# Patient Record
Sex: FEMALE | Race: WHITE | NOT HISPANIC OR LATINO | Employment: FULL TIME | URBAN - NONMETROPOLITAN AREA
[De-identification: names, ages, dates, MRNs, and addresses within clinical notes are randomized per-mention and may not be internally consistent; named-entity substitution may affect disease eponyms.]

---

## 2019-02-12 ENCOUNTER — OFFICE VISIT (OUTPATIENT)
Dept: FAMILY MEDICINE CLINIC | Facility: CLINIC | Age: 29
End: 2019-02-12

## 2019-02-12 VITALS
SYSTOLIC BLOOD PRESSURE: 128 MMHG | HEIGHT: 63 IN | HEART RATE: 76 BPM | WEIGHT: 216.6 LBS | DIASTOLIC BLOOD PRESSURE: 70 MMHG | BODY MASS INDEX: 38.38 KG/M2 | TEMPERATURE: 97.9 F

## 2019-02-12 DIAGNOSIS — S09.90XA TRAUMATIC INJURY OF HEAD, INITIAL ENCOUNTER: Primary | ICD-10-CM

## 2019-02-12 DIAGNOSIS — T14.8XXA HEMATOMA: ICD-10-CM

## 2019-02-12 DIAGNOSIS — S02.2XXA CLOSED FRACTURE OF NASAL BONE, INITIAL ENCOUNTER: ICD-10-CM

## 2019-02-12 DIAGNOSIS — R50.9 FEVER, UNSPECIFIED FEVER CAUSE: ICD-10-CM

## 2019-02-12 PROBLEM — E66.09 CLASS 2 OBESITY DUE TO EXCESS CALORIES WITHOUT SERIOUS COMORBIDITY WITH BODY MASS INDEX (BMI) OF 38.0 TO 38.9 IN ADULT: Chronic | Status: ACTIVE | Noted: 2019-02-12

## 2019-02-12 PROBLEM — J30.1 SEASONAL ALLERGIC RHINITIS DUE TO POLLEN: Chronic | Status: ACTIVE | Noted: 2019-02-12

## 2019-02-12 PROCEDURE — 99203 OFFICE O/P NEW LOW 30 MIN: CPT | Performed by: INTERNAL MEDICINE

## 2019-02-13 NOTE — PROGRESS NOTES
Subjective     Jacquelin Centeno is a 29 y.o. female.     History of Present Illness     I am meeting him for the first time today.  She is a new patient.  She is to the adult daughter of Melissa Epley.  Kevin Epley is her stepfather.  Her past history is reviewed and significant for obesity and seasonal allergies.  She is an  at the [employer].  She is not .  She has no children.  Her PMH, meds, allergies, SH, FH are all reviewed and documented today.    Lillian experienced a rather serious fall 2 weeks ago.  She was walking down stairs to her basement to do a load of laundry when she fell off of the side of the stairs.  She estimates that she fell approximately 4-5 feet, landing on a concrete floor.  The majority of her weight struck her forehead and face.  There was no loss of consciousness.  She denies concussion or postconcussion symptoms.  She experienced a large hematoma on the forehead with a laceration of the forehead.  She also experienced very slightly displaced fractures of her nasal bones.  She was evaluated and treated in the emergency room.  CT imaging of the head revealed no evidence of bleed or other acute intracranial injury.  CT scan of the facial bones revealed approximate 2 mm posterior and left displacement with the fracture.  There was no evidence of orbital fractures or fractures of other facial bones.  ENT evaluation was recommended in approximately 6 weeks after the swelling goes down.  A couple days after the initial fall, she was seen in [facility] urgent care due to some discolored nasal secretions and sinus/facial pressure.  She was given a 10 day course of Omnicef, as well as a Medrol Dosepak.  She is completing the Omnicef today.  She has experienced low-grade fever of approximately 99° nearly every evening, but is afebrile today.  She reports any nasal discharge is now clear.  She reports any postnasal drip is now clear.  She  "takes Claritin 10 mg daily chronically for allergies and postnasal drip.     She has experienced almost complete resolution of the forehead hematoma.  There is still some residual resolving bruising under both eyes.  She is describing some paresthesia when she rubs across her healing forehead scar and up into her anterior scalp.  Reassurance is given.      Review of Systems   Constitutional: Negative for chills, fatigue and fever.   HENT: Positive for congestion, postnasal drip and sinus pressure. Negative for ear pain and sore throat.    Respiratory: Negative for cough, shortness of breath and wheezing.    Cardiovascular: Negative for chest pain, palpitations and leg swelling.   Gastrointestinal: Negative for abdominal pain, blood in stool, constipation, diarrhea, nausea and vomiting.   Endocrine: Negative for cold intolerance, heat intolerance, polydipsia and polyuria.   Genitourinary: Negative for dysuria, frequency, hematuria and urgency.   Skin: Negative for rash.   Neurological: Negative for syncope and weakness.       Objective     /70   Pulse 76   Temp 97.9 °F (36.6 °C) (Oral)   Ht 160 cm (63\")   Wt 98.2 kg (216 lb 9.6 oz)   BMI 38.37 kg/m²     Physical Exam   Constitutional: She is oriented to person, place, and time. She appears well-developed and well-nourished. No distress.   Pleasant, intelligent, articulate, well spoken, obese female.  Accompanied by her mother, Laura.   HENT:   Head: Normocephalic and atraumatic.   Nose: Right sinus exhibits no maxillary sinus tenderness and no frontal sinus tenderness. Left sinus exhibits no maxillary sinus tenderness and no frontal sinus tenderness.   Mouth/Throat: Uvula is midline, oropharynx is clear and moist and mucous membranes are normal. No oral lesions. No tonsillar exudate.   No sinus tenderness.  Bilateral nasal mucosa is somewhat congested and mildly erythematous.  No purulent nasal discharge noted.  Resolving bruising noted under both eyes. "   Eyes: Conjunctivae and EOM are normal. Pupils are equal, round, and reactive to light.   Extraocular movements are intact.  Extremes of eye motions felt to reproduce any discomfort.  Normal vision.   No disconjugate gaze.   Neck: Trachea normal. Neck supple. No JVD present. Carotid bruit is not present. No tracheal deviation present. No thyroid mass and no thyromegaly present.   Cardiovascular: Normal rate, regular rhythm, normal heart sounds and intact distal pulses.  No extrasystoles are present. PMI is not displaced.   No murmur heard.  Pulmonary/Chest: Effort normal and breath sounds normal. No accessory muscle usage. No respiratory distress. She has no decreased breath sounds. She has no wheezes. She has no rhonchi. She has no rales.   Abdominal: Soft. Bowel sounds are normal. She exhibits no distension. There is no hepatosplenomegaly. There is no tenderness.   Obese abdomen.     Vascular Status -  Her right foot exhibits normal foot vasculature  and no edema. Her left foot exhibits normal foot vasculature  and no edema.  Lymphadenopathy:     She has no cervical adenopathy.   Neurological: She is alert and oriented to person, place, and time. No cranial nerve deficit. Coordination normal.   Skin: Skin is warm, dry and intact. No rash noted. No cyanosis. Nails show no clubbing.   The previous laceration of the forehead just proximal to the hairline is healing very nicely.  No sutures to remove.  The previous hematoma of the forehead is almost completely resolved.  Some resolving bruising is noted below both eyes.   Psychiatric: She has a normal mood and affect. Her speech is normal and behavior is normal. Judgment and thought content normal.   Vitals reviewed.      No flowsheet data found.    Assessment/Plan     She will complete the Omnicef antibiotic this evening or tomorrow morning.  I do not recommend any additional antibiotics at this time.  If she experiences persistent fever or recurrence of discolored  nasal discharge or onset of sinus pain or tenderness, she is to notify me.  I do recommend saline nasal spray 2 puffs per nostril 3-4 times daily until all symptoms are resolved.  She may continue the Claritin 10 mg daily if that seems to help decrease the rhinitis or postnasal drip.  She specifically denies chronic clear nasal discharge to suggest any CSF fluid.  Try using phenylephrine 10 mg 3 times a day when necessary to help with some residual congestion that she is experiencing.  I am getting x-rays of the sinuses today to rule out acute sinusitis, but my clinical suspicion is low.  Refer to Dr. Barkley to evaluate the nasal fractures and any residual symptoms.  Reassurance is given today.    We did not discuss her obese body habitus today, but would certainly want to address that with future visits.    Return to clinic as needed.  She was establishing as a new patient today.      Diagnoses and all orders for this visit:    Traumatic injury of head, initial encounter  -     XR Sinuses 3+ View  -     Ambulatory Referral to ENT (Otolaryngology)    Fever, unspecified fever cause  -     Ambulatory Referral to ENT (Otolaryngology)    Hematoma    Closed fracture of nasal bone, initial encounter  -     XR Sinuses 3+ View  -     Ambulatory Referral to ENT (Otolaryngology)        No visits with results within 3 Week(s) from this visit.   Latest known visit with results is:   No results found for any previous visit.   ]

## 2019-02-15 ENCOUNTER — OFFICE VISIT (OUTPATIENT)
Dept: OTOLARYNGOLOGY | Facility: CLINIC | Age: 29
End: 2019-02-15

## 2019-02-15 VITALS — BODY MASS INDEX: 38.8 KG/M2 | OXYGEN SATURATION: 98 % | WEIGHT: 219 LBS | HEIGHT: 63 IN

## 2019-02-15 DIAGNOSIS — S02.2XXA CLOSED FRACTURE OF NASAL BONE, INITIAL ENCOUNTER: Primary | ICD-10-CM

## 2019-02-15 DIAGNOSIS — J34.2 NASAL SEPTAL DEFORMITY: ICD-10-CM

## 2019-02-15 PROCEDURE — 99203 OFFICE O/P NEW LOW 30 MIN: CPT | Performed by: OTOLARYNGOLOGY

## 2019-02-15 PROCEDURE — 31231 NASAL ENDOSCOPY DX: CPT | Performed by: OTOLARYNGOLOGY

## 2019-02-18 NOTE — PROGRESS NOTES
Subjective   Jacquelin Centeno is a 29 y.o. female.     History of Present Illness   Patient reports that 18 days ago she fell at home striking her face and forehead.  Sustained lacerations of her forehead and a nasal fracture.  For reasons not entirely clear, patient states the emergency room doctor told her to wait 6 weeks before she followed up with otolaryngology regarding her nose.  She states that now that the swelling is gone down she is satisfied with the appearance of her nose.  She thinks the airflow may be slightly less than it used to be on the right side.  She was having headaches and low-grade fever and was given antibiotics for presumed sinusitis reports that her headaches are improving.  She is already had her sutures and staples removed from her forehead laceration.      The following portions of the patient's history were reviewed and updated as appropriate: allergies, current medications, past family history, past medical history, past social history, past surgical history and problem list.      Jacquelin Centeno reports that  has never smoked. she has never used smokeless tobacco. She reports that she does not drink alcohol or use drugs.  Patient is not a tobacco user and has not been counseled for use of tobacco products    Family History   Problem Relation Age of Onset   • Diabetes type II Mother         Diet-controlled   • Hypertension Father        No Known Allergies    No current outpatient medications on file.    Past Medical History:   Diagnosis Date   • Class 2 obesity due to excess calories without serious comorbidity with body mass index (BMI) of 38.0 to 38.9 in adult 2/12/2019   • Seasonal allergic rhinitis due to pollen 2/12/2019         Review of Systems   HENT: Positive for congestion, postnasal drip and sore throat.    Eyes: Positive for discharge.   Musculoskeletal: Positive for neck stiffness.   Neurological: Positive for headaches.   All other systems reviewed and are  negative.          Objective   Physical Exam  General: Well-developed well-nourished female in no acute distress.  Alert and oriented x-3. Head: Normocephalic. Face: Recently healed lacerations of the forehead.  Facial nerve function appears intact PERRL. EOMI. Voice:Strong. Speech:Fluent  Ears: External ears no deformity, canals no discharge, tympanic membranes intact with tympanosclerosis but no evidence of infection or effusion  Nose: Nares show septal deformity to the left that appears chronic.  No mucosal tears or hematoma or fresh blood.  The nasal dorsum shows a palpable irregularity on the right more so than the left but it appears stable without crepitus.  Oral cavity: Lips and gums without lesions.  Tongue and floor of mouth without lesions.  Parotid and submandibular ducts unobstructed.  No mucosal lesions on the buccal mucosa or vestibule of the mouth.  Pharynx: No erythema exudate mass or ulcer  Neck: No lymphadenopathy.  No thyromegaly.  Trachea and larynx midline.  No masses in the parotid or submandibular glands.  Nasal endoscopy is performed: Pedro Luis-Synephrine and Xylocaine are instilled the nares bilaterally.  0° scope is passed into each nostril.  The inferior, middle, and superior turbinates as well as nasal septum and nasopharynx are examined.  Pertinent findings include: Septal deformity to the left impacting on the lateral nasal wall at the level of the inferior turbinate that appears to be an old finding with no mucosal disruption.  Middle meatal cleft showed no evidence of mass, polyp, blood, purulence      Assessment/Plan   Jacquelin was seen today for fracture.    Diagnoses and all orders for this visit:    Closed fracture of nasal bone, initial encounter    Nasal septal deformity      Plan: Told the patient that given that she is 18 days out from injury and her nasal bones appear stable I do not think she would be a candidate for closed reduction, but since she is satisfied with the appearance  of her nose at this point this may not present too much of a problem going forward.  I did explain she has a nasal septal deformity and I advised her to wait until 6 months after surgery and decide if her inability to breathe well through her nose was a persistent and bothersome problem.  At that point she could return for a septoplasty or if she subsequently decides the appearance of her nose is unsatisfactory I would refer her for a septorhinoplasty.  Use nasal saline spray as needed.  Follow-up with me as needed.

## 2019-11-04 ENCOUNTER — OFFICE VISIT (OUTPATIENT)
Dept: OBSTETRICS AND GYNECOLOGY | Facility: CLINIC | Age: 29
End: 2019-11-04

## 2019-11-04 VITALS
DIASTOLIC BLOOD PRESSURE: 82 MMHG | BODY MASS INDEX: 35.37 KG/M2 | WEIGHT: 199.6 LBS | SYSTOLIC BLOOD PRESSURE: 120 MMHG | HEART RATE: 68 BPM | HEIGHT: 63 IN

## 2019-11-04 DIAGNOSIS — L98.9 SUPERFICIAL SKIN LESION: ICD-10-CM

## 2019-11-04 DIAGNOSIS — Z01.89 ROUTINE LAB DRAW: ICD-10-CM

## 2019-11-04 DIAGNOSIS — N94.6 DYSMENORRHEA: ICD-10-CM

## 2019-11-04 DIAGNOSIS — E66.9 OBESITY (BMI 35.0-39.9 WITHOUT COMORBIDITY): ICD-10-CM

## 2019-11-04 DIAGNOSIS — F32.81 PMDD (PREMENSTRUAL DYSPHORIC DISORDER): Primary | ICD-10-CM

## 2019-11-04 PROCEDURE — 99214 OFFICE O/P EST MOD 30 MIN: CPT | Performed by: NURSE PRACTITIONER

## 2019-11-04 NOTE — PROGRESS NOTES
"Subjective   Jacquelin Centeno is a 29 y.o. female.     History of Present Illness   Pt presents with concerns about mood swings and severe depression in the week before her period, improving during her period and occasionally some the week after. This has been going on for the last 6 months to a year. She feels tearful and says it \"is scaring her how low\" she gets. She has had some life stressors in the last several months but doesn't attribute these changes to that. She feels it has to be hormonal given the cyclic nature of symptoms. States she is \"absolutely fine\" outside of that time. She denies SI/HI. She has tried Chattaroy's Wart and a Saffron extract for mood and hasn't been able to tell a difference. She has never been diagnosed with a thyroid problem but takes an \"herbal thyroid medicine\". She is seeing a S[employer] counselor once a month who suggested she may need to see a provider about something for her moods.     Pt has never been sexually active. She is very hesitant about taking hormones and would be against taking any mood altering drugs like antidepressants or anxiolytics. Recently lost 50lbs in the last year.     Patient's last menstrual period was 10/18/2019 (approximate). Periods are very regular, 7 days, heavy flow and severe cramping. She can saturate a super tampon in 2-3 hours. Pt has never taken anything for her periods.     She has also noticed a place on her left chest wall/breast that concerns her. It came up like a pimple about 4 months ago. She has been able to drain it more than once with exudate of pus and one time blood. It is not painful, red, or swollen. She feels a hard knot beneath the skin. PGM had breast cancer.     The following portions of the patient's history were reviewed and updated as appropriate: allergies, current medications, past family history, past medical history, past social history, past surgical history and problem list.    Review of Systems   Constitutional: " Negative.  Negative for chills and fever. Weight loss: intentional.   Respiratory: Negative.    Cardiovascular: Negative.    Endocrine: Negative for cold intolerance and heat intolerance.   Genitourinary: Negative.  Positive for breast lump (really on the chest). Negative for breast discharge and breast pain. Menstrual problem: heavy and painful but not concerned about these symptoms, only mood.   Neurological: Negative for dizziness, seizures, syncope and light-headedness.   Psychiatric/Behavioral: Positive for agitation, decreased concentration, depressed mood and stress. Negative for dysphoric mood, self-injury, sleep disturbance and suicidal ideas. The patient is not nervous/anxious.        Objective    Vitals:    11/04/19 1010   BP: 120/82   Pulse: 68         11/04/19  1010   Weight: 90.5 kg (199 lb 9.6 oz)     Body mass index is 35.36 kg/m².    Physical Exam   Constitutional: She is oriented to person, place, and time. She appears well-developed and well-nourished.   Cardiovascular: Normal rate, regular rhythm and normal heart sounds.   Pulmonary/Chest: Effort normal and breath sounds normal. Right breast exhibits no inverted nipple, no mass, no nipple discharge, no skin change and no tenderness. Left breast exhibits no inverted nipple, no mass, no nipple discharge, no skin change and no tenderness. Breasts are symmetrical.       Neurological: She is alert and oriented to person, place, and time.   Psychiatric: She has a normal mood and affect. Her speech is normal and behavior is normal. Judgment and thought content normal. Her mood appears not anxious. Cognition and memory are normal. She does not exhibit a depressed mood.   Pt denies symptoms today, stating she is not due for a period for 2 weeks.    Vitals reviewed.    PHQ-9 Depression Screening. She answered these as to how she feels during the week leading up to her period.   Little interest or pleasure in doing things? 1   Feeling down, depressed, or  hopeless? 1   Trouble falling or staying asleep, or sleeping too much? 1   Feeling tired or having little energy? 0   Poor appetite or overeating? 1   Feeling bad about yourself - or that you are a failure or have let yourself or your family down? 2   Trouble concentrating on things, such as reading the newspaper or watching television? 2   Moving or speaking so slowly that other people could have noticed? Or the opposite - being so fidgety or restless that you have been moving around a lot more than usual? 0   Thoughts that you would be better off dead, or of hurting yourself in some way? 0   PHQ-9 Total Score 8   If you checked off any problems, how difficult have these problems made it for you to do your work, take care of things at home, or get along with other people? Somewhat difficult       Assessment/Plan   Jacquelin was seen today for tearfulness.    Diagnoses and all orders for this visit:    PMDD (premenstrual dysphoric disorder)  -     Progesterone  -     TSH  -     T4, Free  -     Estradiol  -     Follicle Stimulating Hormone    Dysmenorrhea  -     Progesterone  -     TSH  -     T4, Free  -     Estradiol  -     Follicle Stimulating Hormone    Obesity (BMI 35.0-39.9 without comorbidity)  -     TSH  -     T4, Free  -     CBC Auto Differential  -     Comprehensive Metabolic Panel    Routine lab draw  -     TSH  -     T4, Free  -     CBC Auto Differential  -     Comprehensive Metabolic Panel  -     Vitamin D 25 Hydroxy  -     Vitamin B12    Superficial skin lesion      I discussed PMDD at length with pt. I explained the cycle of hormones throughout the menstrual cycle. I recommend we first start with some general labs and hormone labs. I would like for pt to complete these on Friday of this week to get a better evaluation during the luteal phase. Pt agrees to RTC fasting on this day.     I discussed options for management include hormonal measures including OCPs vs antidepressants. Pt is hesitant about both  but would be willing to have that conversation if that is what I feel is necessary. I encouraged her to continue counseling.     Reassured pt that the lesion on the chest wall appears to be a benign cyst. She does not want to go to Dermatology just yet. I encouraged pt to not pick at it and monitor for changes. If S/S of infection, RTC. If still present at follow up visits and pt is ready, we will refer to Dermatology for further evaluation.     We will complete a well woman visit at a later date. Pt agrees with this plan.

## 2019-11-08 LAB
25(OH)D3 SERPL-MCNC: 26.5 NG/ML (ref 30–100)
ALBUMIN SERPL-MCNC: 4.3 G/DL (ref 3.5–5)
ALBUMIN/GLOB SERPL: 1.3 G/DL (ref 1.1–1.8)
ALP SERPL-CCNC: 55 U/L (ref 38–126)
ALT SERPL W P-5'-P-CCNC: 13 U/L
ANION GAP SERPL CALCULATED.3IONS-SCNC: 9 MMOL/L (ref 5–15)
AST SERPL-CCNC: 17 U/L (ref 14–36)
BASOPHILS # BLD AUTO: 0.01 10*3/MM3 (ref 0–0.2)
BASOPHILS NFR BLD AUTO: 0.2 % (ref 0–1.5)
BILIRUB SERPL-MCNC: 0.2 MG/DL (ref 0.2–1.3)
BUN BLD-MCNC: 12 MG/DL (ref 7–23)
BUN/CREAT SERPL: 17.4 (ref 7–25)
CALCIUM SPEC-SCNC: 9.3 MG/DL (ref 8.4–10.2)
CHLORIDE SERPL-SCNC: 108 MMOL/L (ref 101–112)
CO2 SERPL-SCNC: 24 MMOL/L (ref 22–30)
CREAT BLD-MCNC: 0.69 MG/DL (ref 0.52–1.04)
DEPRECATED RDW RBC AUTO: 49.9 FL (ref 37–54)
EOSINOPHIL # BLD AUTO: 0.04 10*3/MM3 (ref 0–0.4)
EOSINOPHIL NFR BLD AUTO: 0.6 % (ref 0.3–6.2)
ERYTHROCYTE [DISTWIDTH] IN BLOOD BY AUTOMATED COUNT: 17.1 % (ref 12.3–15.4)
ESTRADIOL SERPL HS-MCNC: 89.7 PG/ML
FSH SERPL-ACNC: 3.91 MIU/ML
GFR SERPL CREATININE-BSD FRML MDRD: 102 ML/MIN/1.73 (ref 71–165)
GLOBULIN UR ELPH-MCNC: 3.4 GM/DL (ref 2.3–3.5)
GLUCOSE BLD-MCNC: 101 MG/DL (ref 70–99)
HCT VFR BLD AUTO: 38.6 % (ref 34–46.6)
HGB BLD-MCNC: 12.6 G/DL (ref 12–15.9)
LYMPHOCYTES # BLD AUTO: 1.95 10*3/MM3 (ref 0.7–3.1)
LYMPHOCYTES NFR BLD AUTO: 30.5 % (ref 19.6–45.3)
MCH RBC QN AUTO: 26.5 PG (ref 26.6–33)
MCHC RBC AUTO-ENTMCNC: 32.6 G/DL (ref 31.5–35.7)
MCV RBC AUTO: 81.1 FL (ref 79–97)
MONOCYTES # BLD AUTO: 0.54 10*3/MM3 (ref 0.1–0.9)
MONOCYTES NFR BLD AUTO: 8.4 % (ref 5–12)
NEUTROPHILS # BLD AUTO: 3.86 10*3/MM3 (ref 1.7–7)
NEUTROPHILS NFR BLD AUTO: 60.3 % (ref 42.7–76)
PLATELET # BLD AUTO: 255 10*3/MM3 (ref 140–450)
PMV BLD AUTO: 9.5 FL (ref 6–12)
POTASSIUM BLD-SCNC: 4.1 MMOL/L (ref 3.4–5)
PROGEST SERPL-MCNC: 12.1 NG/ML
PROT SERPL-MCNC: 7.7 G/DL (ref 6.3–8.6)
RBC # BLD AUTO: 4.76 10*6/MM3 (ref 3.77–5.28)
SODIUM BLD-SCNC: 141 MMOL/L (ref 137–145)
T4 FREE SERPL-MCNC: 1.17 NG/DL (ref 0.93–1.7)
TSH SERPL DL<=0.05 MIU/L-ACNC: 2.69 UIU/ML (ref 0.27–4.2)
VIT B12 BLD-MCNC: 484 PG/ML (ref 211–946)
WBC NRBC COR # BLD: 6.4 10*3/MM3 (ref 3.4–10.8)

## 2019-11-08 PROCEDURE — 80053 COMPREHEN METABOLIC PANEL: CPT | Performed by: NURSE PRACTITIONER

## 2019-11-08 PROCEDURE — 85025 COMPLETE CBC W/AUTO DIFF WBC: CPT | Performed by: NURSE PRACTITIONER

## 2019-11-08 PROCEDURE — 84439 ASSAY OF FREE THYROXINE: CPT | Performed by: NURSE PRACTITIONER

## 2019-11-08 PROCEDURE — 82306 VITAMIN D 25 HYDROXY: CPT | Performed by: NURSE PRACTITIONER

## 2019-11-08 PROCEDURE — 83001 ASSAY OF GONADOTROPIN (FSH): CPT | Performed by: NURSE PRACTITIONER

## 2019-11-08 PROCEDURE — 36415 COLL VENOUS BLD VENIPUNCTURE: CPT | Performed by: NURSE PRACTITIONER

## 2019-11-08 PROCEDURE — 84443 ASSAY THYROID STIM HORMONE: CPT | Performed by: NURSE PRACTITIONER

## 2019-11-08 PROCEDURE — 84144 ASSAY OF PROGESTERONE: CPT | Performed by: NURSE PRACTITIONER

## 2019-11-08 PROCEDURE — 82607 VITAMIN B-12: CPT | Performed by: NURSE PRACTITIONER

## 2019-11-08 PROCEDURE — 82670 ASSAY OF TOTAL ESTRADIOL: CPT | Performed by: NURSE PRACTITIONER

## 2019-11-12 RX ORDER — ERGOCALCIFEROL 1.25 MG/1
50000 CAPSULE ORAL WEEKLY
Qty: 4 CAPSULE | Refills: 1 | Status: SHIPPED | OUTPATIENT
Start: 2019-11-12 | End: 2020-02-11

## 2019-11-19 RX ORDER — NORETHINDRONE ACETATE AND ETHINYL ESTRADIOL AND FERROUS FUMARATE 1MG-20(24)
1 KIT ORAL DAILY
Qty: 28 TABLET | Refills: 3 | Status: SHIPPED | OUTPATIENT
Start: 2019-11-19 | End: 2020-02-11 | Stop reason: DRUGHIGH

## 2019-11-19 NOTE — PROGRESS NOTES
Pt has decided on OCPs to help with PMDD symptoms. She was instructed how to start her birth control.  It should be started on Sunday.  Because it may take 1 month to become effective, the use of alternative contraception for one month was stressed should she become sexually active.  The potential for breakthrough bleeding for up to 3 cycles was also emphasized. Discussed what to do if 1 and 2 or more days of pills are missed. Mild side effects discussed. Follow up 2/11/20 at 4PM. Patient verbalizes understanding. All questions were answered.

## 2020-02-11 ENCOUNTER — OFFICE VISIT (OUTPATIENT)
Dept: OBSTETRICS AND GYNECOLOGY | Facility: CLINIC | Age: 30
End: 2020-02-11

## 2020-02-11 VITALS
BODY MASS INDEX: 35.86 KG/M2 | WEIGHT: 202.4 LBS | HEIGHT: 63 IN | DIASTOLIC BLOOD PRESSURE: 80 MMHG | HEART RATE: 75 BPM | SYSTOLIC BLOOD PRESSURE: 118 MMHG

## 2020-02-11 DIAGNOSIS — Z79.3 ON ORAL CONTRACEPTIVE PILLS FOR NON-CONTRACEPTION INDICATION: ICD-10-CM

## 2020-02-11 DIAGNOSIS — F32.81 PMDD (PREMENSTRUAL DYSPHORIC DISORDER): Primary | ICD-10-CM

## 2020-02-11 DIAGNOSIS — N92.1 BREAKTHROUGH BLEEDING ON BIRTH CONTROL PILLS: ICD-10-CM

## 2020-02-11 PROCEDURE — 99214 OFFICE O/P EST MOD 30 MIN: CPT | Performed by: NURSE PRACTITIONER

## 2020-02-12 NOTE — PROGRESS NOTES
Subjective   Jacquelin Centeno is a 30 y.o. female.     History of Present Illness   Pt presents for 3 month follow up on OCP for PMDD and heavy periods and cramping. Pt states that emotionally she feels so much better. Denies any depressive thoughts. Denies SI/HI. She states she can't believe how much better she is just by taking OCPs. Her only concerns is the changes to her period. She has had mild cramping outside of her period. The first 2 packs her periods were better, moderate flow and cramps but still lasted 7 days. Despite only having 4 placebo pills, her periods would begin . The last pack, she began spotting 11 days before placebo pills. She is supposed to begin placebo pills tomorrow. She denies missing any pills or taking any other medications that may interfere.     Patient's last menstrual period was 02/01/2020 (exact date).     The following portions of the patient's history were reviewed and updated as appropriate: allergies, current medications, past family history, past medical history, past social history, past surgical history and problem list.    Review of Systems   Constitutional: Negative.  Negative for chills, fever and unexpected weight gain. Appetite change: noticed increase appetite 1st month  but denies that now.   Respiratory: Negative.    Cardiovascular: Negative.    Gastrointestinal: Negative for nausea.   Endocrine: Negative for cold intolerance and heat intolerance.   Genitourinary: Negative.  Menstrual problem: improved periods but having BTB    Neurological: Negative for dizziness, seizures, syncope, light-headedness and headache.   Psychiatric/Behavioral: Negative for agitation, decreased concentration, dysphoric mood, self-injury, sleep disturbance, suicidal ideas, depressed mood and stress. The patient is not nervous/anxious.         Significant improvement       Objective    Vitals:    02/11/20 1555   BP: 118/80   Pulse: 75         02/11/20  1555   Weight: 91.8 kg (202 lb 6.4 oz)      Body mass index is 35.85 kg/m².    Physical Exam   Constitutional: She is oriented to person, place, and time. She appears well-developed and well-nourished.   Cardiovascular: Normal rate, regular rhythm and normal heart sounds.   Pulmonary/Chest: Effort normal and breath sounds normal.   Neurological: She is alert and oriented to person, place, and time.   Skin: Skin is warm and dry.   Psychiatric: She has a normal mood and affect. Her behavior is normal.   Smiling, laughing   Vitals reviewed.        Assessment/Plan   Jacquelin was seen today for follow-up.    Diagnoses and all orders for this visit:    PMDD (premenstrual dysphoric disorder)  -     norgestrel-ethinyl estradiol (LOW-OGESTREL) 0.3-30 MG-MCG per tablet; Take 1 tablet by mouth Daily.    Breakthrough bleeding on birth control pills  -     norgestrel-ethinyl estradiol (LOW-OGESTREL) 0.3-30 MG-MCG per tablet; Take 1 tablet by mouth Daily.    On oral contraceptive pills for non-contraception indication  -     norgestrel-ethinyl estradiol (LOW-OGESTREL) 0.3-30 MG-MCG per tablet; Take 1 tablet by mouth Daily.    I recommend that we switch OCP to help prevent BTB. Pt is hesitant but denies any side effects of the OCP, so I believe a small adjustment in dosing shouldn't be cause for concern.  Complete current pack then switch to new prescription. If BTB continues in the next 2-3 packs or if she has any concerns about the new pill, pt is to call the office and let us know. Otherwise, RTC at the first of the summer when school gets out for a complete well woman exam, pap smear and refills.     Pt agrees with this plan of care.

## 2020-03-04 RX ORDER — NORETHINDRONE ACETATE AND ETHINYL ESTRADIOL AND FERROUS FUMARATE 1MG-20(24)
KIT ORAL
Qty: 28 TABLET | OUTPATIENT
Start: 2020-03-04

## 2020-07-21 DIAGNOSIS — N92.1 BREAKTHROUGH BLEEDING ON BIRTH CONTROL PILLS: ICD-10-CM

## 2020-07-21 DIAGNOSIS — F32.81 PMDD (PREMENSTRUAL DYSPHORIC DISORDER): ICD-10-CM

## 2020-07-21 DIAGNOSIS — Z79.3 ON ORAL CONTRACEPTIVE PILLS FOR NON-CONTRACEPTION INDICATION: ICD-10-CM

## 2020-07-21 RX ORDER — NORGESTREL AND ETHINYL ESTRADIOL 0.3-0.03MG
KIT ORAL
Qty: 28 TABLET | Refills: 0 | Status: SHIPPED | OUTPATIENT
Start: 2020-07-21 | End: 2020-08-03 | Stop reason: SDUPTHER

## 2020-08-03 ENCOUNTER — OFFICE VISIT (OUTPATIENT)
Dept: OBSTETRICS AND GYNECOLOGY | Facility: CLINIC | Age: 30
End: 2020-08-03

## 2020-08-03 ENCOUNTER — LAB (OUTPATIENT)
Dept: LAB | Facility: OTHER | Age: 30
End: 2020-08-03

## 2020-08-03 VITALS
BODY MASS INDEX: 36.11 KG/M2 | HEIGHT: 63 IN | DIASTOLIC BLOOD PRESSURE: 70 MMHG | SYSTOLIC BLOOD PRESSURE: 110 MMHG | HEART RATE: 76 BPM | WEIGHT: 203.8 LBS

## 2020-08-03 DIAGNOSIS — Z01.419 ENCOUNTER FOR GYNECOLOGICAL EXAMINATION WITH PAPANICOLAOU SMEAR OF CERVIX: Primary | ICD-10-CM

## 2020-08-03 DIAGNOSIS — Z79.3 ON ORAL CONTRACEPTIVE PILLS FOR NON-CONTRACEPTION INDICATION: ICD-10-CM

## 2020-08-03 DIAGNOSIS — F32.81 PMDD (PREMENSTRUAL DYSPHORIC DISORDER): ICD-10-CM

## 2020-08-03 PROCEDURE — 99395 PREV VISIT EST AGE 18-39: CPT | Performed by: NURSE PRACTITIONER

## 2020-08-03 NOTE — PROGRESS NOTES
"Subjective   Chief Complaint   Patient presents with   • Gynecologic Exam     well woman annual      Jacquelin Centeno is a 30 y.o. year old  presenting to be seen for her annual exam.  Today she is doing well on OCP that we switched to in Feb. Moods are still great, no depression with periods. May be tearful on her period but is not emotional throughout the month like she was before. Periods are regular, no more BTB. She has concerns about \"pre diabetes\". Last glucose was 101 fasting. She is getting up to 113 fasting and 140-160 2 hours PP. She notes she is not eating low carb diet and needs to do better. Her mom is also type 2 DM. There is a strong family hx of diabetes. She does not want to do any medications and wants to work on her diet and exercise regimen first.     Patient's last menstrual period was 2020 (exact date).    OB History        0    Para   0    Term   0       0    AB   0    Living   0       SAB   0    TAB   0    Ectopic   0    Molar   0    Multiple   0    Live Births   0                Current birth control method: abstinence and OCP (estrogen/progesterone).    She is not sexually active. Has never been sexually active.      Past 6 month menstrual history:    Cycle Frequency: regular, predictable and consistent every 28 - 32 days   Menstrual cycle character: flow is typically normal   Cycle Duration: 3 - 4   Number of heavy days of flows: 0   Dysmenorrhea: mild and is not affecting her activities of daily living   PMS: is not affecting her activities of daily living   Intermenstrual bleeding present: no   Post-coital bleeding present: no     She exercises regularly: no.  She wears her seat belt:yes.  She has concerns about domestic violence: no.  Last colonoscopy: Never  Last DEXA: Never  Last MMG: Never  Last pap:  Never   History of abnormal PAP: N/A    The following portions of the patient's history were reviewed and updated as appropriate:problem list, current " "medications, allergies, past family history, past medical history, past social history and past surgical history.    Social History    Tobacco Use      Smoking status: Never Smoker      Smokeless tobacco: Never Used    Social History     Substance and Sexual Activity   Alcohol Use No   • Frequency: Never      Review of Systems   Constitutional: Negative.  Negative for appetite change, chills and fever. Unexpected weight change: up 4lb since Nov 2019.   Respiratory: Negative.  Negative for shortness of breath.    Cardiovascular: Negative.    Gastrointestinal: Negative.  Negative for constipation and diarrhea.   Endocrine: Negative.  Negative for polydipsia, polyphagia and polyuria.   Genitourinary: Negative.  Negative for menstrual problem and pelvic pain.   Skin: Negative.    Neurological: Negative for dizziness, syncope, light-headedness and headaches.   Psychiatric/Behavioral: Negative for agitation, behavioral problems, sleep disturbance and suicidal ideas. The patient is not nervous/anxious.          Objective   /70   Pulse 76   Ht 160 cm (63\")   Wt 92.4 kg (203 lb 12.8 oz)   LMP 07/29/2020 (Exact Date)   Breastfeeding No   BMI 36.10 kg/m²     General:  well developed; well nourished  no acute distress   Skin:  No suspicious lesions seen   Thyroid: not examined   Breasts:  Examined in supine position  Symmetric without masses or skin dimpling  Nipples normal without inversion, lesions or discharge  There are no palpable axillary nodes   Abdomen: soft, non-tender; no masses  no umbilical or inguinal hernias are present  no hepato-splenomegaly  Normal findings: bowel sounds normal   Cardiac: Heart sounds are normal.  Regular rate and rhythm without murmur, gallop or rub.   Resp: Normal expansion.  Clear to auscultation.  No rales, rhonchi, or wheezing.   Psych: alert,oriented, in NAD with a full range of affect, normal behavior and no psychotic features   Pelvis: Uterus:  Clinical staff was present for " exam  External genitalia:  normal appearance of the external genitalia including Bartholin's and [place]'s glands.  :  urethral meatus normal;  Vaginal:  normal pink mucosa without prolapse or lesions  Cervix:  normal appearance. difficult to fully visualize due to pt discomfort  Uterus:  normal size, shape and consistency  Adnexa:  normal bimanual exam of the adnexa.  Rectal:  digital rectal exam not performed; anus visually normal appearing.     Lab Review   CBC, CMP and TSH    Imaging  No data reviewed       Diagnoses and all orders for this visit:    Encounter for gynecological examination with Papanicolaou smear of cervix  -     Liquid-based Pap Smear, Screening  Pap results: I will send card in mail or call if abnormal. RTC annually for well woman exams.     PMDD (premenstrual dysphoric disorder)  -     norgestrel-ethinyl estradiol (Low-Ogestrel) 0.3-30 MG-MCG per tablet; Take 1 tablet by mouth Daily.    On oral contraceptive pills for non-contraception indication  -     norgestrel-ethinyl estradiol (Low-Ogestrel) 0.3-30 MG-MCG per tablet; Take 1 tablet by mouth Daily.    Continue OCP. No longer having BTB and moods are well controlled on this. Monitor and RTC if she has any concerns.     Discussed low carb diet and exercise. I printed pt a handout on glucose monitoring and ranges that are considered pre-diabetic/diabetic. Pt will monitor and RTC if she is consistently staying elevated despite diet modifications.     This note was electronically signed.    Sabina Haro, APRN  August 3, 2020

## 2020-08-11 LAB
LAB AP CASE REPORT: NORMAL
PATH INTERP SPEC-IMP: NORMAL

## 2021-05-24 ENCOUNTER — OFFICE VISIT (OUTPATIENT)
Dept: OBSTETRICS AND GYNECOLOGY | Facility: CLINIC | Age: 31
End: 2021-05-24

## 2021-05-24 VITALS
BODY MASS INDEX: 37.17 KG/M2 | DIASTOLIC BLOOD PRESSURE: 78 MMHG | SYSTOLIC BLOOD PRESSURE: 120 MMHG | HEIGHT: 63 IN | WEIGHT: 209.8 LBS | HEART RATE: 84 BPM

## 2021-05-24 DIAGNOSIS — E66.9 OBESITY (BMI 30-39.9): ICD-10-CM

## 2021-05-24 DIAGNOSIS — F32.1 CURRENT MODERATE EPISODE OF MAJOR DEPRESSIVE DISORDER WITHOUT PRIOR EPISODE (HCC): Primary | ICD-10-CM

## 2021-05-24 PROCEDURE — 99213 OFFICE O/P EST LOW 20 MIN: CPT | Performed by: NURSE PRACTITIONER

## 2021-05-24 RX ORDER — BUPROPION HYDROCHLORIDE 150 MG/1
150 TABLET ORAL EVERY MORNING
Qty: 30 TABLET | Refills: 1 | Status: SHIPPED | OUTPATIENT
Start: 2021-05-24 | End: 2021-07-07 | Stop reason: SDUPTHER

## 2021-05-25 NOTE — PROGRESS NOTES
"Subjective   Jacquelin Centeno is a 31 y.o. female.     History of Present Illness   Pt presents with concerns about depression. Pt presented almost 18 months ago with these type of symptoms but only before her period. It seemed more PMDD like, than depression. We started her on OCPs and she did great. She admits it was working well until the last 4 months or so. She states she \"feels off\" \"feels really low\". She states this is throughout the cycle, not just with her period. She has little motivation to do things. Admits she took a shower under the faucet in the bath tub just because she didn't have the energy to stand up to shower. She admits she scared herself last week when she was so low she was having thoughts about the world being better off without her. Never suicidal intentions or plans just the thought crossing her mind. She has a great family support system and they encouraged her to go ahead and reach out for help. She denies any SI/HI now. She is a strong [employer] and leans on her samuel to help her through hard times as well. She wrestles with the stigma of mental health and the need to take medications when she \"wants to conquer it\" herself. But she feels she is at a place in her life where it is necessary.     She had lost quite a bit of weight in the Fall. She was running 5ks but is no longer exercising. But between the holidays, her birth in January and then the depression onset, she has gained about 15lb back. She struggles with self consciousness because of this as well.     The following portions of the patient's history were reviewed and updated as appropriate: allergies, current medications, past family history, past medical history, past social history, past surgical history and problem list.    Review of Systems   Constitutional: Positive for unexpected weight gain. Negative for chills and fever.   Genitourinary: Negative for menstrual problem.   Psychiatric/Behavioral: Positive for depressed mood " and stress. Negative for agitation, hallucinations, sleep disturbance and suicidal ideas. The patient is not nervous/anxious.        Objective   Physical Exam  Vitals reviewed.   Constitutional:       Appearance: She is obese.   Pulmonary:      Effort: Pulmonary effort is normal.   Neurological:      Mental Status: She is alert and oriented to person, place, and time.   Psychiatric:         Attention and Perception: Attention normal.         Mood and Affect: Mood is depressed. Mood is not anxious. Affect is tearful.         Speech: Speech normal.         Behavior: Behavior normal.         Thought Content: Thought content normal. Thought content does not include homicidal or suicidal ideation. Thought content does not include homicidal or suicidal plan.         Cognition and Memory: Cognition normal.         Judgment: Judgment normal.         PHQ-9 Depression Screening  Little interest or pleasure in doing things? 3   Feeling down, depressed, or hopeless? 3   Trouble falling or staying asleep, or sleeping too much? 0   Feeling tired or having little energy? 2   Poor appetite or overeating? 2   Feeling bad about yourself - or that you are a failure or have let yourself or your family down? 3   Trouble concentrating on things, such as reading the newspaper or watching television? 0   Moving or speaking so slowly that other people could have noticed? Or the opposite - being so fidgety or restless that you have been moving around a lot more than usual? 0   Thoughts that you would be better off dead, or of hurting yourself in some way? 1   PHQ-9 Total Score 14   If you checked off any problems, how difficult have these problems made it for you to do your work, take care of things at home, or get along with other people? Not difficult at all     Assessment/Plan   Diagnoses and all orders for this visit:    1. Current moderate episode of major depressive disorder without prior episode (CMS/[facility]) (Primary)  -     buPROPion XL  (Wellbutrin XL) 150 MG 24 hr tablet; Take 1 tablet by mouth Every Morning.  Dispense: 30 tablet; Refill: 1    2. Obesity (BMI 30-39.9)  -     buPROPion XL (Wellbutrin XL) 150 MG 24 hr tablet; Take 1 tablet by mouth Every Morning.  Dispense: 30 tablet; Refill: 1      We had previously discussed the possibility about taking Wellbutrin. Pt is agreeable to this today. We discussed depression vs anxiety. Discussed situational vs chemical imbalances. Based on prolonged nature and severity of her symptoms, I do believe it is necessary she begin a treatment plan. She is not ready for counseling at this time. We discussed Wellbutrin benefits including mood improvement and appetite suppression to assist with weight loss.    I strongly encouraged getting back to exercising not only for physical, but mental health. Pt seems motivated to do so.     Pt is excited about the potential for improvement on this regimen. Begin Wellbutrin XL 150mg in the AM. Discussed a realistic timeline to effectiveness. Monitor for any negative side effects including headaches or jitteriness. RTC for video visit in 6 weeks or sooner PRN. Pt agrees with this plan of care.

## 2021-06-07 DIAGNOSIS — Z79.3 ON ORAL CONTRACEPTIVE PILLS FOR NON-CONTRACEPTION INDICATION: ICD-10-CM

## 2021-06-07 DIAGNOSIS — F32.81 PMDD (PREMENSTRUAL DYSPHORIC DISORDER): ICD-10-CM

## 2021-06-07 RX ORDER — NORGESTREL-ETHINYL ESTRADIOL 0.3-0.03MG
TABLET ORAL
Qty: 84 TABLET | Refills: 0 | Status: SHIPPED | OUTPATIENT
Start: 2021-06-07 | End: 2021-08-04 | Stop reason: SDUPTHER

## 2021-07-07 ENCOUNTER — OFFICE VISIT (OUTPATIENT)
Dept: OBSTETRICS AND GYNECOLOGY | Facility: CLINIC | Age: 31
End: 2021-07-07

## 2021-07-07 VITALS
DIASTOLIC BLOOD PRESSURE: 76 MMHG | BODY MASS INDEX: 37.21 KG/M2 | SYSTOLIC BLOOD PRESSURE: 118 MMHG | HEART RATE: 90 BPM | WEIGHT: 210 LBS | HEIGHT: 63 IN

## 2021-07-07 DIAGNOSIS — E66.9 OBESITY (BMI 30-39.9): ICD-10-CM

## 2021-07-07 DIAGNOSIS — F32.1 CURRENT MODERATE EPISODE OF MAJOR DEPRESSIVE DISORDER WITHOUT PRIOR EPISODE (HCC): ICD-10-CM

## 2021-07-07 PROCEDURE — 99214 OFFICE O/P EST MOD 30 MIN: CPT | Performed by: NURSE PRACTITIONER

## 2021-07-07 RX ORDER — BUPROPION HYDROCHLORIDE 150 MG/1
150 TABLET ORAL EVERY MORNING
Qty: 30 TABLET | Refills: 5 | Status: SHIPPED | OUTPATIENT
Start: 2021-07-07 | End: 2021-08-04 | Stop reason: SDUPTHER

## 2021-07-09 NOTE — PROGRESS NOTES
"Subjective   Jacquelin Centeno is a 31 y.o. female.     History of Present Illness   Pt presents for FU on Wellbutrin for depression and obesity. Pt states she can tell a drastic difference in her mood. She feels more energized and able to complete tasks. She denies any further thoughts that the world would be better without her. She admits she had a few episodes of anger but isn't sure if that is medication related or just her. She has noticed moments every few days where she feels mildly jittery but only for a couple for a couple of minutes, then is subsides. She isn't bothered by it but wanted to mention it. She has not lost any weight but was on vacation last week and notes that ruined her dieting. She has noticed a decrease in appetite on it. She wishes to continue on this.     5/24/2021 She had concerns about depression. Pt presented almost 18 months ago with these type of symptoms but only before her period. It seemed more PMDD like, than depression. We started her on OCPs and she did great. She admits it was working well until the last 4 months or so. She states she \"feels off\" \"feels really low\". She states this is throughout the cycle, not just with her period. She has little motivation to do things. Admits she took a shower under the faucet in the bath tub just because she didn't have the energy to stand up to shower. She admits she scared herself last week when she was so low she was having thoughts about the world being better off without her. Never suicidal intentions or plans just the thought crossing her mind. She has a great family support system and they encouraged her to go ahead and reach out for help. She denies any SI/HI now. She is a strong Protestant and leans on her samuel to help her through hard times as well. She wrestles with the stigma of mental health and the need to take medications when she \"wants to conquer it\" herself. But she feels she is at a place in her life where it is necessary. She " had lost quite a bit of weight in the Fall. She was running 5ks but is no longer exercising. But between the holidays, her birth in January and then the depression onset, she has gained about 15lb back. She struggles with self consciousness because of this as well.     The following portions of the patient's history were reviewed and updated as appropriate: allergies, current medications, past family history, past medical history, past social history, past surgical history and problem list.    Review of Systems   Constitutional: Positive for appetite change (decrease). Negative for chills, fever and unexpected weight gain.   Respiratory: Negative.    Cardiovascular: Negative.  Negative for palpitations.   Genitourinary: Negative for menstrual problem.   Psychiatric/Behavioral: Negative for agitation, hallucinations, sleep disturbance, suicidal ideas, depressed mood and stress. The patient is not nervous/anxious.        Objective    Vitals:    07/07/21 1005   BP: 118/76   Pulse: 90         07/07/21  1005   Weight: 95.3 kg (210 lb)     Body mass index is 37.2 kg/m².    Physical Exam  Vitals reviewed.   Constitutional:       Appearance: She is obese.   Pulmonary:      Effort: Pulmonary effort is normal.   Neurological:      Mental Status: She is alert and oriented to person, place, and time.   Psychiatric:         Attention and Perception: Attention normal.         Mood and Affect: Mood is not anxious or depressed. Affect is not tearful.         Speech: Speech normal.         Behavior: Behavior normal.         Thought Content: Thought content normal. Thought content does not include homicidal or suicidal ideation. Thought content does not include homicidal or suicidal plan.         Cognition and Memory: Cognition normal.         Judgment: Judgment normal.      Comments: Much improved affect today           Assessment/Plan   Diagnoses and all orders for this visit:    1. Current moderate episode of major depressive  disorder without prior episode (CMS/HCC)  -     buPROPion XL (Wellbutrin XL) 150 MG 24 hr tablet; Take 1 tablet by mouth Every Morning.  Dispense: 30 tablet; Refill: 5    2. Obesity (BMI 30-39.9)  -     buPROPion XL (Wellbutrin XL) 150 MG 24 hr tablet; Take 1 tablet by mouth Every Morning.  Dispense: 30 tablet; Refill: 5      Pt is doing well on Wellbutrin. She has had a little jitteriness but it is minimal and tolerable. She wishes to continue using.      I strongly encouraged getting back to exercising not only for physical, but mental health. Pt seems motivated to do so.     RTC in 3 months or sooner PRN. Pt agrees with this plan of care.

## 2021-07-22 DIAGNOSIS — E66.9 OBESITY (BMI 30-39.9): ICD-10-CM

## 2021-07-22 DIAGNOSIS — F32.1 CURRENT MODERATE EPISODE OF MAJOR DEPRESSIVE DISORDER WITHOUT PRIOR EPISODE (HCC): ICD-10-CM

## 2021-07-22 RX ORDER — BUPROPION HYDROCHLORIDE 150 MG/1
150 TABLET ORAL EVERY MORNING
Qty: 30 TABLET | Refills: 5 | OUTPATIENT
Start: 2021-07-22 | End: 2022-07-22

## 2021-08-04 ENCOUNTER — OFFICE VISIT (OUTPATIENT)
Dept: OBSTETRICS AND GYNECOLOGY | Facility: CLINIC | Age: 31
End: 2021-08-04

## 2021-08-04 ENCOUNTER — LAB (OUTPATIENT)
Dept: LAB | Facility: OTHER | Age: 31
End: 2021-08-04

## 2021-08-04 VITALS
HEIGHT: 63 IN | BODY MASS INDEX: 36.43 KG/M2 | WEIGHT: 205.6 LBS | SYSTOLIC BLOOD PRESSURE: 118 MMHG | DIASTOLIC BLOOD PRESSURE: 78 MMHG | HEART RATE: 82 BPM

## 2021-08-04 DIAGNOSIS — F32.1 CURRENT MODERATE EPISODE OF MAJOR DEPRESSIVE DISORDER WITHOUT PRIOR EPISODE (HCC): ICD-10-CM

## 2021-08-04 DIAGNOSIS — Z01.419 ENCOUNTER FOR GYNECOLOGICAL EXAMINATION WITH PAPANICOLAOU SMEAR OF CERVIX: Primary | ICD-10-CM

## 2021-08-04 DIAGNOSIS — Z79.3 ON ORAL CONTRACEPTIVE PILLS FOR NON-CONTRACEPTION INDICATION: ICD-10-CM

## 2021-08-04 DIAGNOSIS — F32.81 PMDD (PREMENSTRUAL DYSPHORIC DISORDER): ICD-10-CM

## 2021-08-04 DIAGNOSIS — E66.9 OBESITY (BMI 30-39.9): ICD-10-CM

## 2021-08-04 PROCEDURE — 99395 PREV VISIT EST AGE 18-39: CPT | Performed by: NURSE PRACTITIONER

## 2021-08-04 PROCEDURE — 99213 OFFICE O/P EST LOW 20 MIN: CPT | Performed by: NURSE PRACTITIONER

## 2021-08-04 RX ORDER — BUPROPION HYDROCHLORIDE 300 MG/1
300 TABLET ORAL EVERY MORNING
Qty: 30 TABLET | Refills: 5 | Status: SHIPPED | OUTPATIENT
Start: 2021-08-04 | End: 2022-02-14

## 2021-08-04 RX ORDER — NORGESTREL-ETHINYL ESTRADIOL 0.3-0.03MG
1 TABLET ORAL DAILY
Qty: 84 TABLET | Refills: 3 | Status: SHIPPED | OUTPATIENT
Start: 2021-08-04 | End: 2022-02-28 | Stop reason: ALTCHOICE

## 2021-08-04 NOTE — PROGRESS NOTES
"Subjective   Chief Complaint   Patient presents with   • Gynecologic Exam     WWE   • Depression     feels like she needs her medication adjusted     Jacquelin Centeno is a 31 y.o. year old  presenting to be seen for her annual exam.  Today she is doing well on OCP. Periods are regular, no more BTB. I saw her 1 month ago for FU on Wellbutrin after initiating for depression. Pt states that while she is still better than prior to starting, she still doesn't \"feel right\". She seems to have frequent crying spells. Often retreats to her room midday to just sleep. She is trying the social and non medicinal ways to help, and admits they do for short times, but she feels she needs medication adjustment. Denies any jitteriness or anger outbursts now. Has noticed some hot flashes but otherwise has no negative side effects on Wellbutrin XL 150mg. We discussed changing medication completely vs increasing dose. Pt would like to try increasing dose first and then if negative side effects arise or not efficacious, we can try a different class of medications. She has lost 5lb in the last month.     Patient's last menstrual period was 2021 (approximate).    OB History        0    Para   0    Term   0       0    AB   0    Living   0       SAB   0    TAB   0    Ectopic   0    Molar   0    Multiple   0    Live Births   0                Current birth control method: abstinence and OCP (estrogen/progesterone).    She is not sexually active. Has never been sexually active.      Past 6 month menstrual history:    Cycle Frequency: regular, predictable and consistent every 28 - 32 days   Menstrual cycle character: flow is typically light   Cycle Duration: 3 - 4   Number of heavy days of flows: 0   Dysmenorrhea: mild and is not affecting her activities of daily living   PMS: is not affecting her activities of daily living   Intermenstrual bleeding present: no   Post-coital bleeding present: no     She exercises " "regularly: yes.  She wears her seat belt:yes.  She has concerns about domestic violence: no.  Last colonoscopy: Never  Last DEXA: Never  Last MMG: Never  Last pap:  8/3/2020   History of abnormal PAP: No    The following portions of the patient's history were reviewed and updated as appropriate:problem list, current medications, allergies, past family history, past medical history, past social history and past surgical history.    Social History    Tobacco Use      Smoking status: Never Smoker      Smokeless tobacco: Never Used    Social History     Substance and Sexual Activity   Alcohol Use No      Review of Systems   Constitutional: Negative.  Negative for appetite change, chills and fever. Unexpected weight change: intentional weight loss.   Respiratory: Negative.  Negative for shortness of breath.    Cardiovascular: Negative.    Gastrointestinal: Negative.  Negative for constipation and diarrhea.   Endocrine: Negative.  Negative for polydipsia, polyphagia and polyuria.   Genitourinary: Negative.  Negative for menstrual problem and pelvic pain.   Skin: Negative.    Neurological: Negative for dizziness, syncope, light-headedness and headaches.   Psychiatric/Behavioral: Negative for agitation, behavioral problems, sleep disturbance and suicidal ideas. The patient is not nervous/anxious.         Depression         Objective   /78   Pulse 82   Ht 160 cm (63\")   Wt 93.3 kg (205 lb 9.6 oz)   LMP 07/21/2021 (Approximate)   Breastfeeding No   BMI 36.42 kg/m²     General:  well developed; well nourished  no acute distress   Skin:  No suspicious lesions seen   Thyroid: not examined   Breasts:  Examined in supine position  Symmetric without masses or skin dimpling  Nipples normal without inversion, lesions or discharge  There are no palpable axillary nodes   Abdomen: soft, non-tender; no masses  no umbilical or inguinal hernias are present  no hepato-splenomegaly  Normal findings: bowel sounds normal   Cardiac: " Heart sounds are normal.  Regular rate and rhythm without murmur, gallop or rub.   Resp: Normal expansion.  Clear to auscultation.  No rales, rhonchi, or wheezing.   Psych: alert,oriented, in NAD with a full range of affect, normal behavior and no psychotic features   Pelvis: Uterus:  Clinical staff was present for exam  External genitalia:  normal appearance of the external genitalia including Bartholin's and [place]'s glands.  :  urethral meatus normal;  Vaginal:  normal pink mucosa without prolapse or lesions  Cervix:  normal appearance. difficult to fully visualize due to pt discomfort  Uterus:  normal size, shape and consistency  Adnexa:  normal bimanual exam of the adnexa.  Rectal:  digital rectal exam not performed; anus visually normal appearing.   Pediatric speculum needed     Lab Review   CBC, CMP and TSH    Imaging  No data reviewed       Diagnoses and all orders for this visit:    1. Encounter for gynecological examination with Papanicolaou smear of cervix (Primary)  -     Liquid-based Pap Smear, Screening    2. Current moderate episode of major depressive disorder without prior episode (CMS/HCC)  -     buPROPion XL (Wellbutrin XL) 300 MG 24 hr tablet; Take 1 tablet by mouth Every Morning.  Dispense: 30 tablet; Refill: 5    3. Obesity (BMI 30-39.9)  -     buPROPion XL (Wellbutrin XL) 300 MG 24 hr tablet; Take 1 tablet by mouth Every Morning.  Dispense: 30 tablet; Refill: 5    4. PMDD (premenstrual dysphoric disorder)  -     norgestrel-ethinyl estradiol ([place]-28) 0.3-30 MG-MCG per tablet; Take 1 tablet by mouth Daily.  Dispense: 84 tablet; Refill: 3    5. On oral contraceptive pills for non-contraception indication  -     norgestrel-ethinyl estradiol ([place]-28) 0.3-30 MG-MCG per tablet; Take 1 tablet by mouth Daily.  Dispense: 84 tablet; Refill: 3    Pap results: I will send card in mail or call if abnormal. RTC annually for well woman exams.     SBE encouraged monthly. MMG not indicated until  39yo.     Praised for weight loss in the last month. Discussed continued exercise and diet changes to help with mood.     She is doing well on this OCP and wishes to continue taking. Not SA, no risk of STI.     We discussed changing medication completely vs increasing dose. Pt would like to try increasing dose first and then if negative side effects arise or not efficacious, we can try a different class of medications. If doing well after 3-4 weeks, continue. If still having concerns, pt will contact me for further change of plans.     Discussed COVID vaccination. She is thinking about getting it. She is a teacher. Will consider it further after today's discussion.     This note was electronically signed.    Sabina Haro, APRN  August 4, 2021

## 2021-08-12 LAB
LAB AP CASE REPORT: NORMAL
PATH INTERP SPEC-IMP: NORMAL

## 2021-09-16 DIAGNOSIS — E66.9 OBESITY (BMI 30-39.9): ICD-10-CM

## 2021-09-16 DIAGNOSIS — F32.1 CURRENT MODERATE EPISODE OF MAJOR DEPRESSIVE DISORDER WITHOUT PRIOR EPISODE (HCC): ICD-10-CM

## 2021-09-17 RX ORDER — BUPROPION HYDROCHLORIDE 150 MG/1
150 TABLET ORAL EVERY MORNING
Qty: 30 TABLET | Refills: 5 | OUTPATIENT
Start: 2021-09-17 | End: 2022-09-17

## 2021-09-17 NOTE — TELEPHONE ENCOUNTER
Patient said there was a mix up at the pharmacy but wants to stay on the 300. Her family ended up sick the other day too. She thinks it might have been food poisoning.

## 2021-11-23 ENCOUNTER — OFFICE VISIT (OUTPATIENT)
Dept: FAMILY MEDICINE CLINIC | Facility: CLINIC | Age: 31
End: 2021-11-23

## 2021-11-23 VITALS
DIASTOLIC BLOOD PRESSURE: 88 MMHG | WEIGHT: 209.4 LBS | HEIGHT: 63 IN | TEMPERATURE: 98.5 F | HEART RATE: 88 BPM | SYSTOLIC BLOOD PRESSURE: 138 MMHG | BODY MASS INDEX: 37.1 KG/M2

## 2021-11-23 DIAGNOSIS — G43.009 MIGRAINE WITHOUT AURA AND WITHOUT STATUS MIGRAINOSUS, NOT INTRACTABLE: ICD-10-CM

## 2021-11-23 DIAGNOSIS — R51.9 SINUS HEADACHE: Primary | ICD-10-CM

## 2021-11-23 DIAGNOSIS — R09.81 NASAL CONGESTION: ICD-10-CM

## 2021-11-23 DIAGNOSIS — J30.1 SEASONAL ALLERGIC RHINITIS DUE TO POLLEN: Chronic | ICD-10-CM

## 2021-11-23 DIAGNOSIS — F33.42 RECURRENT MAJOR DEPRESSIVE DISORDER, IN FULL REMISSION (HCC): Chronic | ICD-10-CM

## 2021-11-23 DIAGNOSIS — J34.89 NASAL PAIN: ICD-10-CM

## 2021-11-23 PROCEDURE — 99214 OFFICE O/P EST MOD 30 MIN: CPT | Performed by: INTERNAL MEDICINE

## 2021-11-23 NOTE — PROGRESS NOTES
"Chief Complaint  Facial Pain (feels a sore spot in right nostril x 2 months. She states she had a nasal fracture  several years ago. She states she read where this can be r/t headaches which she also has )    Subjective          History of Present Illness     Jacquelin Centeno presents to the clinic reporting pain in the nasal area.  She has a history of nasal fracture sustained in a fall approximately three years ago.  She noticed an abnormality in the right nostril 6-8 weeks ago.  She researched online and noticed headaches could be associated to the nasal pain.      For the past year, she has noticed frontal headaches occurring 3 to 4 days weekly. Her father has migraine headaches. The headaches are rarely debilitating and normally occur early morning hours when the headache is more severe.  She wakes on some days with the headache and other days, the headache comes on during the day.  She reports a mild headache today.  She sometimes experiences photophobia and phonophobia.  Denies aura.   She has been on oral contraceptives for a couple of years, which she is taking more for hormonal symptoms.  Denies being sexually active and is not using for contraception.  She works on a computer several hours daily where the screen light tends to bother her.  Wearing bluelight glasses helps some and she takes 2-3 Ibuprofen on days she has the headaches, giving temporary relief.  She struggles with seasonal allergies and reports chronic postnasal drainage, for which she uses herbal nasal spray and takes Claritin daily year round to treat the seasonal allergy symptoms.     Sabina Perez increased her dose of Wellbutrin to 300 mg,which has helped patient manage depression.  She feels symptoms are much improved with the higher dose of Wellbutrin.        Objective   Vital Signs:   /88   Pulse 88   Temp 98.5 °F (36.9 °C) (Tympanic)   Ht 160 cm (63\")   Wt 95 kg (209 lb 6.4 oz)   BMI 37.09 kg/m²       Physical " Exam  Constitutional:       General: She is not in acute distress.     Appearance: She is well-developed.   HENT:      Head: Normocephalic and atraumatic.      Nose: Nose normal.      Comments: Boggy, erythematous  turbinates.  Slight deviation of the septum to the right.         Mouth/Throat:      Pharynx: No oropharyngeal exudate.      Comments: Chronic clear postnasal drip.    Eyes:      Pupils: Pupils are equal, round, and reactive to light.   Neck:      Thyroid: No thyromegaly.      Vascular: No JVD.   Cardiovascular:      Rate and Rhythm: Normal rate and regular rhythm.      Heart sounds: Normal heart sounds.   Pulmonary:      Effort: Pulmonary effort is normal. No accessory muscle usage or respiratory distress.      Breath sounds: Normal breath sounds. No wheezing or rales.   Abdominal:      General: Bowel sounds are normal. There is no distension.      Palpations: Abdomen is soft.      Tenderness: There is no abdominal tenderness.   Musculoskeletal:      Cervical back: Neck supple.   Lymphadenopathy:      Cervical: No cervical adenopathy.   Neurological:      Mental Status: She is alert and oriented to person, place, and time.      Cranial Nerves: No cranial nerve deficit.   Psychiatric:         Speech: Speech normal.         Behavior: Behavior normal.         Thought Content: Thought content normal.         Judgment: Judgment normal.            Result Review :                   Assessment and Plan    Diagnoses and all orders for this visit:    1. Sinus headache (Primary)  -     Ambulatory Referral to ENT (Otolaryngology)    2. Migraine without aura and without status migrainosus, not intractable    3. Nasal congestion  -     Ambulatory Referral to ENT (Otolaryngology)    4. Nasal pain  -     Ambulatory Referral to ENT (Otolaryngology)    5. Seasonal allergic rhinitis due to pollen    6. Recurrent major depressive disorder, in full remission (HCC) - good response to Welbutrin      I spent 33 minutes caring  leda Jacquelin on this date of service. This time includes time spent by me in the following activities:preparing for the visit, reviewing tests, performing a medically appropriate examination and/or evaluation , counseling and educating the patient/family/caregiver, ordering medications, tests, or procedures and documenting information in the medical record     Refer to Dr. Barkley, ENT, to address the subtle abnormality in right naris.      I explained the oral contraceptives could be playing a role in the headaches.  She may want to consider getting off of the oral contraceptives or switching to a lower dose since she is not sexually active and her depression symptoms are currently adequately managed with the Wellbutrin.  Recommended she discuss this with Sabina Perez.      If she finds she is requiring more frequent use of the Ibuprofen to treat the headaches, I recommended she switch to naproxen sodium, which will give extended pain relief.      For the seasonal allergy symptoms, I recommended running a humidifier in the bedroom during winter heating season to help increase moisture in the air.  Using saline nasal spray 1-2 sprays each nostril several times daily.  Continue the daily antihistamine, loratadine.  Dr. Barkley may recommend adding nasal steroids, based on his findings    Return p,r.n.     Scribed for Dr. Peter by Nisa Sheehan [facility].     Follow Up   Return if symptoms worsen or fail to improve.  Patient was given instructions and counseling regarding her condition or for health maintenance advice. Please see specific information pulled into the AVS if appropriate.

## 2022-02-12 DIAGNOSIS — E66.9 OBESITY (BMI 30-39.9): ICD-10-CM

## 2022-02-12 DIAGNOSIS — F32.1 CURRENT MODERATE EPISODE OF MAJOR DEPRESSIVE DISORDER WITHOUT PRIOR EPISODE: ICD-10-CM

## 2022-02-14 RX ORDER — BUPROPION HYDROCHLORIDE 300 MG/1
300 TABLET ORAL EVERY MORNING
Qty: 30 TABLET | Refills: 5 | Status: SHIPPED | OUTPATIENT
Start: 2022-02-14 | End: 2022-08-29

## 2022-02-22 ENCOUNTER — OFFICE VISIT (OUTPATIENT)
Dept: OTOLARYNGOLOGY | Facility: CLINIC | Age: 32
End: 2022-02-22

## 2022-02-22 VITALS — HEIGHT: 63 IN | OXYGEN SATURATION: 99 % | WEIGHT: 208 LBS | BODY MASS INDEX: 36.86 KG/M2

## 2022-02-22 DIAGNOSIS — J34.2 NASAL SEPTAL DEFORMITY: Primary | ICD-10-CM

## 2022-02-22 PROCEDURE — 99203 OFFICE O/P NEW LOW 30 MIN: CPT | Performed by: OTOLARYNGOLOGY

## 2022-02-23 ENCOUNTER — PATIENT ROUNDING (BHMG ONLY) (OUTPATIENT)
Dept: OTOLARYNGOLOGY | Facility: CLINIC | Age: 32
End: 2022-02-23

## 2022-02-23 NOTE — PROGRESS NOTES
This is Stacy with Dr. Dawn office calling to welcome you to our practice I seen you were a new patient of ours from yesterday. I have a couple of questions about your recent visit if you would like to give me a call back, Thank you and have a great day.

## 2022-08-27 DIAGNOSIS — E66.9 OBESITY (BMI 30-39.9): ICD-10-CM

## 2022-08-27 DIAGNOSIS — F32.1 CURRENT MODERATE EPISODE OF MAJOR DEPRESSIVE DISORDER WITHOUT PRIOR EPISODE: ICD-10-CM

## 2022-08-29 RX ORDER — BUPROPION HYDROCHLORIDE 300 MG/1
300 TABLET ORAL EVERY MORNING
Qty: 30 TABLET | Refills: 1 | Status: SHIPPED | OUTPATIENT
Start: 2022-08-29 | End: 2022-10-03 | Stop reason: SDUPTHER

## 2022-10-03 ENCOUNTER — OFFICE VISIT (OUTPATIENT)
Dept: OBSTETRICS AND GYNECOLOGY | Facility: CLINIC | Age: 32
End: 2022-10-03

## 2022-10-03 ENCOUNTER — LAB (OUTPATIENT)
Dept: LAB | Facility: OTHER | Age: 32
End: 2022-10-03

## 2022-10-03 VITALS
WEIGHT: 206.2 LBS | HEIGHT: 63 IN | DIASTOLIC BLOOD PRESSURE: 80 MMHG | SYSTOLIC BLOOD PRESSURE: 114 MMHG | HEART RATE: 84 BPM | BODY MASS INDEX: 36.54 KG/M2

## 2022-10-03 DIAGNOSIS — F32.5 MAJOR DEPRESSIVE DISORDER WITH SINGLE EPISODE, IN REMISSION: ICD-10-CM

## 2022-10-03 DIAGNOSIS — Z01.419 ENCOUNTER FOR GYNECOLOGICAL EXAMINATION WITH PAPANICOLAOU SMEAR OF CERVIX: Primary | ICD-10-CM

## 2022-10-03 DIAGNOSIS — E66.9 OBESITY (BMI 30-39.9): ICD-10-CM

## 2022-10-03 PROCEDURE — 99395 PREV VISIT EST AGE 18-39: CPT | Performed by: NURSE PRACTITIONER

## 2022-10-03 RX ORDER — BUPROPION HYDROCHLORIDE 300 MG/1
300 TABLET ORAL EVERY MORNING
Qty: 30 TABLET | Refills: 11 | Status: SHIPPED | OUTPATIENT
Start: 2022-10-03

## 2022-10-03 NOTE — PROGRESS NOTES
Subjective   Chief Complaint   Patient presents with   • Gynecologic Exam     WWE   • Med Refill     wellbutrin     Jacquelin Centeno is a 32 y.o. year old  presenting to be seen for her annual exam.  Today she is doing well on Wellbutrin XL 300mg. She denies any concerns with it. Stopped OCP a while ago due to not needing contraceptive and having some headaches. She is doing well without it. Periods are regular. Moods shift mildly in the premenstrual phase but it is tolerable now with the wellbutrin. She wishes to remain off OCP.     Patient's last menstrual period was 09/15/2022 (approximate).    OB History        0    Para   0    Term   0       0    AB   0    Living   0       SAB   0    IAB   0    Ectopic   0    Molar   0    Multiple   0    Live Births   0                Current birth control method: abstinence     She is not sexually active. Has never been sexually active.      Past 6 month menstrual history:    Cycle Frequency: regular, predictable and consistent every 28 - 32 days   Menstrual cycle character: flow is typically normal   Cycle Duration: 6-7   Number of heavy days of flows: 0   Dysmenorrhea: mild and is not affecting her activities of daily living   PMS: mild and is not affecting her activities of daily living   Intermenstrual bleeding present: no   Post-coital bleeding present: no     She exercises regularly: yes.  She wears her seat belt:yes.  She has concerns about domestic violence: no.  Last colonoscopy: Never  Last DEXA: Never  Last MMG: Never  Last pap:  23   History of abnormal PAP: No    The following portions of the patient's history were reviewed and updated as appropriate:problem list, current medications, allergies, past family history, past medical history, past social history and past surgical history.    Social History    Tobacco Use      Smoking status: Never Smoker      Smokeless tobacco: Never Used    Social History     Substance and Sexual Activity  "  Alcohol Use No      Review of Systems   Constitutional: Negative.  Negative for appetite change, chills and fever.   Respiratory: Negative.  Negative for shortness of breath.    Cardiovascular: Negative.    Gastrointestinal: Negative.  Negative for constipation and diarrhea.   Endocrine: Negative.  Negative for polydipsia, polyphagia and polyuria.   Genitourinary: Negative.  Negative for menstrual problem and pelvic pain.   Skin: Negative.    Neurological: Negative for dizziness, syncope, light-headedness and headaches.   Psychiatric/Behavioral: Negative for agitation, behavioral problems, sleep disturbance and suicidal ideas. The patient is not nervous/anxious.         Depression currently managed         Objective   /80   Pulse 84   Ht 160 cm (63\")   Wt 93.5 kg (206 lb 3.2 oz)   LMP 09/15/2022 (Approximate)   Breastfeeding No   BMI 36.53 kg/m²     General:  well developed; well nourished  no acute distress  obese - Body mass index is 36.53 kg/m².   Skin:  No suspicious lesions seen   Thyroid: not examined   Breasts:  Examined in supine position  Symmetric without masses or skin dimpling  Nipples normal without inversion, lesions or discharge  There are no palpable axillary nodes   Abdomen: soft, non-tender; no masses  no umbilical or inguinal hernias are present  no hepato-splenomegaly  Normal findings: bowel sounds normal   Cardiac: Heart sounds are normal.  Regular rate and rhythm without murmur, gallop or rub.   Resp: Normal expansion.  Clear to auscultation.  No rales, rhonchi, or wheezing.   Psych: alert,oriented, in NAD with a full range of affect, normal behavior and no psychotic features   Pelvis: Uterus:  Clinical staff was present for exam  External genitalia:  normal appearance of the external genitalia including Bartholin's and [place]'s glands.  :  urethral meatus normal;  Vaginal:  normal pink mucosa without prolapse or lesions  Cervix:  normal appearance. difficult to fully visualize " due to pt discomfort  Uterus:  normal size, shape and consistency  Adnexa:  normal bimanual exam of the adnexa.  Rectal:  digital rectal exam not performed; anus visually normal appearing.   Pediatric speculum needed     Lab Review   No data reviewed    Imaging  No data reviewed       Diagnoses and all orders for this visit:    1. Encounter for gynecological examination with Papanicolaou smear of cervix (Primary)  -     LIQUID-BASED PAP SMEAR, [facility] ([facility])    2. Major depressive disorder with single episode, in remission (HCC)  -     buPROPion XL (WELLBUTRIN XL) 300 MG 24 hr tablet; Take 1 tablet by mouth Every Morning.  Dispense: 30 tablet; Refill: 11    3. Obesity (BMI 30-39.9)  -     buPROPion XL (WELLBUTRIN XL) 300 MG 24 hr tablet; Take 1 tablet by mouth Every Morning.  Dispense: 30 tablet; Refill: 11    Pap results: I will send card in mail or call if abnormal. RTC annually for well woman exams.     SBE encouraged monthly. MMG not indicated until 41yo.     Not SA, no risk of STI or need for contraception    Continue wellbutrin XL 300mg every AM. Depression well managed.     This note was electronically signed.    Sabina Haro, APRN  October 3, 2022

## 2022-10-06 LAB — REF LAB TEST METHOD: NORMAL

## 2022-10-27 DIAGNOSIS — E66.9 OBESITY (BMI 30-39.9): ICD-10-CM

## 2022-10-27 DIAGNOSIS — F32.5 MAJOR DEPRESSIVE DISORDER WITH SINGLE EPISODE, IN REMISSION: ICD-10-CM

## 2022-10-28 RX ORDER — BUPROPION HYDROCHLORIDE 300 MG/1
300 TABLET ORAL EVERY MORNING
Qty: 30 TABLET | Refills: 11 | OUTPATIENT
Start: 2022-10-28

## 2023-04-25 ENCOUNTER — LAB (OUTPATIENT)
Dept: LAB | Facility: OTHER | Age: 33
End: 2023-04-25
Payer: COMMERCIAL

## 2023-04-25 ENCOUNTER — TRANSCRIBE ORDERS (OUTPATIENT)
Dept: LAB | Facility: OTHER | Age: 33
End: 2023-04-25
Payer: COMMERCIAL

## 2023-04-25 DIAGNOSIS — D64.9 ANEMIA, UNSPECIFIED TYPE: ICD-10-CM

## 2023-04-25 DIAGNOSIS — R53.83 FATIGUE, UNSPECIFIED TYPE: ICD-10-CM

## 2023-04-25 DIAGNOSIS — M25.50 ARTHRALGIA, UNSPECIFIED JOINT: ICD-10-CM

## 2023-04-25 DIAGNOSIS — R53.83 FATIGUE, UNSPECIFIED TYPE: Primary | ICD-10-CM

## 2023-04-25 LAB
HCT VFR BLD AUTO: 38.2 % (ref 34–46.6)
HETEROPH AB SER QL LA: NEGATIVE
HGB BLD-MCNC: 12.3 G/DL (ref 12–15.9)
RBC # BLD AUTO: 4.62 10*6/MM3 (ref 3.77–5.28)
RHEUMATOID FACT SERPL-ACNC: NEGATIVE [IU]/ML
URATE SERPL-MCNC: 6 MG/DL (ref 2.5–6.2)

## 2023-04-25 PROCEDURE — 85014 HEMATOCRIT: CPT | Performed by: INTERNAL MEDICINE

## 2023-04-25 PROCEDURE — 86430 RHEUMATOID FACTOR TEST QUAL: CPT | Performed by: INTERNAL MEDICINE

## 2023-04-25 PROCEDURE — 85041 AUTOMATED RBC COUNT: CPT | Performed by: INTERNAL MEDICINE

## 2023-04-25 PROCEDURE — 86308 HETEROPHILE ANTIBODY SCREEN: CPT | Performed by: INTERNAL MEDICINE

## 2023-04-25 PROCEDURE — 36415 COLL VENOUS BLD VENIPUNCTURE: CPT | Performed by: INTERNAL MEDICINE

## 2023-04-25 PROCEDURE — 84550 ASSAY OF BLOOD/URIC ACID: CPT | Performed by: INTERNAL MEDICINE

## 2023-04-25 PROCEDURE — 85018 HEMOGLOBIN: CPT | Performed by: INTERNAL MEDICINE

## 2023-04-25 PROCEDURE — 82728 ASSAY OF FERRITIN: CPT | Performed by: NURSE PRACTITIONER

## 2023-04-26 ENCOUNTER — LAB (OUTPATIENT)
Dept: LAB | Facility: OTHER | Age: 33
End: 2023-04-26
Payer: COMMERCIAL

## 2023-04-26 DIAGNOSIS — R53.83 FATIGUE, UNSPECIFIED TYPE: ICD-10-CM

## 2023-04-26 DIAGNOSIS — M25.50 ARTHRALGIA, UNSPECIFIED JOINT: ICD-10-CM

## 2023-04-26 DIAGNOSIS — D64.9 ANEMIA, UNSPECIFIED TYPE: ICD-10-CM

## 2023-04-26 LAB
FERRITIN SERPL-MCNC: 117 NG/ML (ref 13–150)
HEMOCCULT STL QL: NEGATIVE

## 2023-04-26 PROCEDURE — 82270 OCCULT BLOOD FECES: CPT | Performed by: INTERNAL MEDICINE
